# Patient Record
Sex: FEMALE | Race: OTHER | HISPANIC OR LATINO | URBAN - METROPOLITAN AREA
[De-identification: names, ages, dates, MRNs, and addresses within clinical notes are randomized per-mention and may not be internally consistent; named-entity substitution may affect disease eponyms.]

---

## 2022-11-18 ENCOUNTER — OFFICE VISIT (OUTPATIENT)
Dept: NEUROLOGY | Facility: MEDICAL CENTER | Age: 32
End: 2022-11-18
Attending: PSYCHIATRY & NEUROLOGY
Payer: COMMERCIAL

## 2022-11-18 VITALS
SYSTOLIC BLOOD PRESSURE: 112 MMHG | BODY MASS INDEX: 21.72 KG/M2 | HEIGHT: 64 IN | WEIGHT: 127.21 LBS | HEART RATE: 87 BPM | OXYGEN SATURATION: 95 % | DIASTOLIC BLOOD PRESSURE: 58 MMHG | TEMPERATURE: 97.2 F

## 2022-11-18 DIAGNOSIS — R56.9 SEIZURE (HCC): Primary | ICD-10-CM

## 2022-11-18 PROCEDURE — 99205 OFFICE O/P NEW HI 60 MIN: CPT | Performed by: PSYCHIATRY & NEUROLOGY

## 2022-11-18 PROCEDURE — 99202 OFFICE O/P NEW SF 15 MIN: CPT | Performed by: PSYCHIATRY & NEUROLOGY

## 2022-11-18 RX ORDER — LEVOTHYROXINE SODIUM 0.05 MG/1
50 TABLET ORAL
COMMUNITY
Start: 2022-11-05

## 2022-11-18 ASSESSMENT — PATIENT HEALTH QUESTIONNAIRE - PHQ9: CLINICAL INTERPRETATION OF PHQ2 SCORE: 0

## 2022-11-18 NOTE — PROGRESS NOTES
"Reason for Consult:  Concern for Seizure(s)    Here with Rebaer (Marilu)- [id number]    History of present illness:    Kathryn Scott 32 y.o. right female woman who is here with her .  She has a certificate in  in Vaccines and worked in a medication management plant.    She is here with her  who has  to Kathryn for about 1 year.    She found out she was pregnant 2022 (unplanned) and then on 2022- she was in the shower and suddenly had a convulsion around 6:00 pm. She felt flush,heat and headache (global lasting 5-10) just before the event.  She was stiff and her arms came up and were shaking 20-30 seconds.  She did not recall she fainted and she felt like she went to another place for \"some moments\"- she was confused per  for 1 minute.     She had significant stress in the following days after 2022.    The medical  was on  2022.      That morning she was feeling fine and that afternoon had gone to the gym.      She had a 3 or 4 nose bleeds lasting 1 minute in the last year.  She has nose bleeds since age 21 (non provoked)> tends to happen in hot weather.       comments that found out she was pregnant  (end of 2022) and when we talked about this she commented that she was under a lot of stress.  over thinks everything.  She has been worried about getting a medical .    Since describes having seizure like event since age 9.  She recalls having a episode where she was on the floor for several minutes but did not lose consciousness (1st episode).     She had another at age 21 event > at that time was not eating or sleeping good and she was taking care of her aunt in the hospital. Her aunt saw the episode to the 2022 without the nose bleed.  She does not recall if she had any prodromal features.    No history to suggest nocturnal seizure(s)- such as tongue biting,tongue soreness, " "tongue bleeding, urinary incontinence, soreness of limbs when awakening the morning or falling out of bed (unexplained) in the middle of the night.    Never put on medication for suspected seizure(s) in her life.    Sleep \"good sleeper\" per  in the recent 12 months- no subjective arousals; averaging 7-8 hours nearly every night and feeling well refreshed in the mornings nearly every day in the recent few years.      Kathryn  feeling excessive anxious,nervous, paranoid or hallucinatory in the recent months.    No history of suicidality, alcohol or tobacco use in her adult life.    No history of birth issues (prematurity), childhood developmental issues or febrile seizure(s) as a child.    No history of discrete head trauma episodes in adult life.    Family: no family history of seizures or \"attacks\" or other neurological issues known in the blood relatives.  Nose bleeding - sister (when she was child).  No other known bleeding issues in blood relatives.        There are no problems to display for this patient.      Past medical history:   No past medical history on file.    Past surgical history:   No past surgical history on file.      Social history:   Social History     Socioeconomic History    Marital status:      Spouse name: Not on file    Number of children: Not on file    Years of education: Not on file    Highest education level: Not on file   Occupational History    Not on file   Tobacco Use    Smoking status: Not on file    Smokeless tobacco: Not on file   Substance and Sexual Activity    Alcohol use: Not on file    Drug use: Not on file    Sexual activity: Not on file   Other Topics Concern    Not on file   Social History Narrative    Not on file     Social Determinants of Health     Financial Resource Strain: Not on file   Food Insecurity: Not on file   Transportation Needs: Not on file   Physical Activity: Not on file   Stress: Not on file   Social Connections: Not on file   Intimate " "Partner Violence: Not on file   Housing Stability: Not on file       Family history:   No family history on file.      Current medications:   Current Outpatient Medications   Medication    levothyroxine (SYNTHROID) 50 MCG Tab     No current facility-administered medications for this visit.       Medication Allergy:  Allergies   Allergen Reactions    Penicillin G            Physical examination:   Vitals:    11/18/22 0749   BP: 112/58   BP Location: Right arm   Patient Position: Sitting   BP Cuff Size: Adult   Pulse: 87   Temp: 36.2 °C (97.2 °F)   TempSrc: Temporal   SpO2: 95%   Weight: 57.7 kg (127 lb 3.3 oz)   Height: 1.626 m (5' 4\")       Normal Cephalic Atraumatic.  General: Full Range of Movement around the Neck in all directions without restrictions or discrete pain evoked triggers.  No lower extremity edema.      Neurological  Exam:    Mental status: Awake, alert and fully oriented to person, place, time, and situation. Normal attention, concentration, and fund of knowledge for education level.  Did not appear/act combative,irritable,anxious,paranoid/delusional or aggressive to or with me.  Speech and language: Speech is fluent without errors, clear, intact to repetition, and intact to naming.     Follows 3 step motor commands in sequence without significant delay and correctly.    Cranial nerve exam:  II: Pupils are equally round and reactive to light. Visual fields are intact by confrontation.  III, IV, VI: EOMI, no diplopia, no ptosis.  V: Sensation to light touch is normal over V1-3 distributions bilaterally.  .  VII: Facial movements are symmetrical. There is no facial droop. .  VIII: Hearing intact to soft speech and finger rub bilaterally  IX: Palate elevates symmetrically, uvula is midline. Dysarthria is not present.  XI: Shoulder shrug are symmetrical and strong.   XII: Tongue protrudes midline.        Motor exam:  Muscle tone is normal in all 4 limbs. and No abnormal movements appreciated.    Muscle " "strength:    Neck Flexors/Extensors: 5/5       Right  Left  Deltoid   5/5  5/5      Biceps   5/5  5/5  Triceps             5/5  5/5   Wrist extensors 5/5  5/5  Wrist flexors  5/5  5/5     5/5  5/5  Interossei  5/5  5/5  Thenar (APB)  5/5  5/5   Hip flexors  5/5  5/5  Quadriceps  5/5  5/5    Hamstrings  5/5  5/5  Dorsiflexors  5/5  5/5  Plantarflexors  5/5  5/5  Toe extension  5/5  5/5  Toe Flexors                5/5                   5/5      Reflexes:       Right  Left  Biceps   2/4  2/4  Triceps              2/4 2/4  Brachioradialis    2/4  2/4  Knee jerk  2/4 2/4  Ankle jerk  2/4  2/4     Frontal release signs are normal.    bilaterally toes are downgoing to plantar stimulation..    Coordination (finger-to-nose, heel/knee/shin, rapid alternating movements) was normal.     There was no truncal ataxia, no tremors, and no dysmetria.     Station and gait - easily stands up from exam chair without retropulsion,veering,leaning,swaying (to either side).   Arm swing symmetrical.    No Rombergism.      Lab work: May 11th  2022: [facility]: Chol, CMP, CBC with Diff: wnl.  A1C% under 5.0    TSH: 9.17 (elevated)> now on Thyroid replacement    Impression/Plans/Recommendations:    Seizure(s)- episode preceded by a nose bleed and a prodrome of a brief global headache lasting 10 seconds and event witnessed by  from beginning to end with palor noticed by also with post event confusion lasting 1-2 minutes.        She was stiff (\"tight\") per the  and there mild saliva coming out of her         Mouth.      2.  Hypothyroidism- recent TSH of 9.17 in May 2022 and now on replacement Rx.      There was a questionable other  sudden event at age 21 when she was visiting her sick aunt in the hospital. This was not able to be well described to me and the  was not present.  This event was not associated with tongue biting, subjective stiffness of the body, urinary incontinece or daniel confusion. Apparently not witnessed " "by a nurse.    Her last episode was in March 2022 and she has not any prodromal features to suggest aura(s) or warning episdoes or nocturnal events in her  adult life.     I am doubtul that having 2 episodes over 11 years apart are from vasovagal synope or cardiac related events without any cardiac type other symptoms like chest pain(s), pressures, palpatiations, periodic lightheadness,faintness/near syncpoal feelings in the recent months to years.    She is not driving.    Plans:    A. Brain MRI to evaluate structure of the brain tissue.    B. 48 EEG (Ambulatory) to be done to look for interictal abnormalities.    C. We discussed the consideration of using an anti epileptic medication vs not taking such a medication whether the above testing is normal or not. If the tests are normal then I believe the overall risk is \"low\" but we have no way to well determine the long term risk of having another episode given the 2 episodes were over 10 years apart.    The medication that I would consider using in the long term is called Keppra.    If she has another episode in the future which I at this point I can't predict can or will happen, she (and ) told to immediately contact me whether it's months or years in the future and consideration of an AED would be considered.        I have performed  a history and physical exam and a directed /focused  ROS today.    Total time spent today or this patient's care was 62 minutes  and included reviewing diagnostic workup to date (labs and imaging that include interpreting such tests relevant to this patient's neurological condition),  reviewing/obtaining separately obtained history (from patient and/or accompanying family member)  for today's neurological problem(s) , ordering either/or medications and additional tests, giving advise and suggestions on the present neurological problem and  documenting the clinical information in the EMR.    Follow up at this time " UZMA Blount MD  [facility].   [facility]

## 2022-12-19 ENCOUNTER — NON-PROVIDER VISIT (OUTPATIENT)
Dept: NEUROLOGY | Facility: MEDICAL CENTER | Age: 32
End: 2022-12-19
Attending: STUDENT IN AN ORGANIZED HEALTH CARE EDUCATION/TRAINING PROGRAM
Payer: COMMERCIAL

## 2022-12-19 DIAGNOSIS — R56.9 SEIZURE (HCC): ICD-10-CM

## 2022-12-19 PROCEDURE — 95708 EEG WO VID EA 12-26HR UNMNTR: CPT | Performed by: PSYCHIATRY & NEUROLOGY

## 2022-12-19 PROCEDURE — 95719 EEG PHYS/QHP EA INCR W/O VID: CPT | Performed by: PSYCHIATRY & NEUROLOGY

## 2022-12-19 PROCEDURE — 95700 EEG CONT REC W/VID EEG TECH: CPT | Performed by: PSYCHIATRY & NEUROLOGY

## 2022-12-19 NOTE — PROCEDURES
[facility]    Ambulatory Electroencephalogram Report          Patient Name: Kathryn Scott  MRN: [id number]  Date of Service: 14:24 on 12/19/2022 to 13:07 on 12/20/22  Total Recording Time: 22 hours and 41 minutes.  Referring Provider: Pollo Shane M.D.    INDICATION:  Kathryn Scott 32 y.o. female presenting with possible seizures in context of pregnancy, though she had episodes that might be seizures since age 9 (found her self on the floor with no clear LOC).     CURRENT ANTI-SEIZURE MEDICATIONS:     Current Outpatient Medications:     levothyroxine, 50 mcg, Oral, QDAY    TECHNIQUE: CVEEG was set up by a Neurodiagnostic technologist who performed education to the patient and staff. A minimum of 23 electrodes and 23 channel recording was setup and performed by Neurodiagnostic technologist, in accordance with the international 10-20 system. Impedence, electrode integrity, and technical impressions were documented a minimum of every 2-24 hour period by a Neurodiagnostic Technologist and reviewed by Interpreting Physician. The study was reviewed in bipolar and referential montages. The recording examined the patient in the awake, drowsy, and sleep state(s).     DESCRIPTION OF THE RECORD:  During maximal wakefulness, the background was continuous, symmetrical, and showed a 12 Hz posterior dominant rhythm.  Reactivity and state changes were present.  During drowsiness, a loss of myogenic artifact and theta/delta frequencies were seen.     Sleep was captured and was characterized by diffuse background delta/theta activity with a loss of myogenic artifact.  N2 sleep transients in the form of sleep spindles and vertex waves were seen in the leads over the central regions.     ACTIVATION PROCEDURES:   Hyperventilation was performed by the patient for a total of 3 minutes. The technician performing the test noted good effort. This technique did not elicited any abnormalities on EEG.  and Intermittent  Photic stimulation was performed in a stepwise fashion from 1 to 30 Hz and did not elicited any abnormalities on EEG.     ICTAL AND INTERICTAL FINDINGS:   No focal or generalized epileptiform activity noted.     No persistent regional slowing was seen during this routine study.      No electroclinical or electrographic seizures were reported or recorded during the study.     EKG: Sampling of the EKG recording did not demonstrate any abnormalities    EVENTS:  No clinical events recorded or reported; there were several event button push events, but these appeared accidental.    INTERPRETATION: This was a normal ambulatory EEG during wakefulness, drowsiness, and sleep. There were no reported events during this study period.     As always, a normal EEG does not exclude the diagnosis of seizures/epilepsy and if clinical suspicion persists, a repeat study might be considered.     Andreas Ziegler MD  Neurology Attending, Epilepsy Program  [facility]

## 2022-12-20 ENCOUNTER — NON-PROVIDER VISIT (OUTPATIENT)
Dept: NEUROLOGY | Facility: MEDICAL CENTER | Age: 32
End: 2022-12-20
Attending: STUDENT IN AN ORGANIZED HEALTH CARE EDUCATION/TRAINING PROGRAM
Payer: COMMERCIAL

## 2022-12-20 DIAGNOSIS — R56.9 SEIZURE (HCC): ICD-10-CM

## 2022-12-20 PROCEDURE — 95719 EEG PHYS/QHP EA INCR W/O VID: CPT | Performed by: PSYCHIATRY & NEUROLOGY

## 2022-12-20 PROCEDURE — 95708 EEG WO VID EA 12-26HR UNMNTR: CPT | Performed by: PSYCHIATRY & NEUROLOGY

## 2022-12-21 ENCOUNTER — NON-PROVIDER VISIT (OUTPATIENT)
Dept: NEUROLOGY | Facility: MEDICAL CENTER | Age: 32
End: 2022-12-21
Attending: STUDENT IN AN ORGANIZED HEALTH CARE EDUCATION/TRAINING PROGRAM
Payer: COMMERCIAL

## 2022-12-21 PROCEDURE — 99999 PR NO CHARGE: CPT | Performed by: PSYCHIATRY & NEUROLOGY

## 2022-12-21 NOTE — PROCEDURES
[facility]    Ambulatory Electroencephalogram Report          Patient Name: Kathryn Scott  MRN: [id number]  Date of Service: 13:09 on 12/20/2022 to 13:02 on 12/21/22  Total Recording Time: 23 hours and 38 minutes.  Referring Provider: Pollo Shane M.D.    INDICATION:  Kathryn Scott 32 y.o. female presenting with possible seizures in context of pregnancy, though she had episodes that might be seizures since age 9 (found her self on the floor with no clear LOC).     CURRENT ANTI-SEIZURE MEDICATIONS:     Current Outpatient Medications:     levothyroxine, 50 mcg, Oral, QDAY    TECHNIQUE: Ambulatory EEG was set up by a Neurodiagnostic technologist who performed education to the patient and staff. A minimum of 23 electrodes and 23 channel recording was setup and performed by Neurodiagnostic technologist, in accordance with the international 10-20 system. Impedence, electrode integrity, and technical impressions were documented a minimum of every 2-24 hour period by a Neurodiagnostic Technologist and reviewed by Interpreting Physician. The study was reviewed in bipolar and referential montages. The recording examined the patient in the awake, drowsy, and sleep state(s).     DESCRIPTION OF THE RECORD:  During maximal wakefulness, the background was continuous, symmetrical, and showed a 12 Hz posterior dominant rhythm.  Reactivity and state changes were present.  During drowsiness, a loss of myogenic artifact and theta/delta frequencies were seen.     Sleep was captured and was characterized by diffuse background delta/theta activity with a loss of myogenic artifact.  N2 sleep transients in the form of sleep spindles and vertex waves were seen in the leads over the central regions.     ACTIVATION PROCEDURES:   N/A    ICTAL AND INTERICTAL FINDINGS:   No focal or generalized epileptiform activity noted.     No persistent regional slowing was seen during this routine study.      No electroclinical or  electrographic seizures were reported or recorded during the study.     EKG: Sampling of the EKG recording did not demonstrate any abnormalities    EVENTS:  No clinical events recorded or reported; there were several event button push events, but these appeared accidental.    INTERPRETATION: This was a normal ambulatory EEG during wakefulness, drowsiness, and sleep. There were no reported events during this study period.     As always, a normal EEG does not exclude the diagnosis of seizures/epilepsy and if clinical suspicion persists, a repeat study might be considered.     Andreas Ziegler MD  Neurology Attending, Epilepsy Program  [facility]

## 2022-12-22 ENCOUNTER — HOSPITAL ENCOUNTER (OUTPATIENT)
Dept: RADIOLOGY | Facility: MEDICAL CENTER | Age: 32
End: 2022-12-22
Attending: PSYCHIATRY & NEUROLOGY
Payer: COMMERCIAL

## 2022-12-22 DIAGNOSIS — R56.9 SEIZURE (HCC): ICD-10-CM

## 2022-12-22 PROCEDURE — 70551 MRI BRAIN STEM W/O DYE: CPT

## 2023-01-09 ENCOUNTER — TELEPHONE (OUTPATIENT)
Dept: NEUROLOGY | Facility: MEDICAL CENTER | Age: 33
End: 2023-01-09
Payer: COMMERCIAL

## 2023-01-20 ENCOUNTER — TELEPHONE (OUTPATIENT)
Dept: NEUROLOGY | Facility: MEDICAL CENTER | Age: 33
End: 2023-01-20

## 2023-01-20 ENCOUNTER — TELEPHONE (OUTPATIENT)
Dept: NEUROLOGY | Facility: MEDICAL CENTER | Age: 33
End: 2023-01-20
Payer: COMMERCIAL

## 2023-01-23 NOTE — TELEPHONE ENCOUNTER
Celestine Blount M.D.  Coty Fitzpatrick, Med Ass't  Caller: Unspecified (3 days ago, 11:46 AM)  Myah     The Brain MRI and ambulatory EEG testing were both normal for this patient done towards the middle towards the end of December 2022.     Jon     Called patient thru [employer] and gave above information.She verbalized understanding and had no further questions.